# Patient Record
Sex: FEMALE | Race: WHITE | NOT HISPANIC OR LATINO | Employment: FULL TIME | URBAN - METROPOLITAN AREA
[De-identification: names, ages, dates, MRNs, and addresses within clinical notes are randomized per-mention and may not be internally consistent; named-entity substitution may affect disease eponyms.]

---

## 2017-08-24 ENCOUNTER — ALLSCRIPTS OFFICE VISIT (OUTPATIENT)
Dept: OTHER | Facility: OTHER | Age: 52
End: 2017-08-24

## 2017-08-24 ENCOUNTER — TRANSCRIBE ORDERS (OUTPATIENT)
Dept: ADMINISTRATIVE | Facility: HOSPITAL | Age: 52
End: 2017-08-24

## 2017-08-24 DIAGNOSIS — N20.0 URIC ACID NEPHROLITHIASIS: Primary | ICD-10-CM

## 2017-08-24 LAB
BILIRUB UR QL STRIP: NEGATIVE
CLARITY UR: NORMAL
COLOR UR: YELLOW
GLUCOSE (HISTORICAL): NEGATIVE
HGB UR QL STRIP.AUTO: NEGATIVE
KETONES UR STRIP-MCNC: NEGATIVE MG/DL
LEUKOCYTE ESTERASE UR QL STRIP: NORMAL
NITRITE UR QL STRIP: NEGATIVE
PH UR STRIP.AUTO: 6 [PH]
PROT UR STRIP-MCNC: NORMAL MG/DL
SP GR UR STRIP.AUTO: 1.03
UROBILINOGEN UR QL STRIP.AUTO: 0.2

## 2018-01-14 VITALS
DIASTOLIC BLOOD PRESSURE: 58 MMHG | HEIGHT: 66 IN | WEIGHT: 157 LBS | BODY MASS INDEX: 25.23 KG/M2 | SYSTOLIC BLOOD PRESSURE: 94 MMHG

## 2018-02-22 ENCOUNTER — HOSPITAL ENCOUNTER (OUTPATIENT)
Dept: ULTRASOUND IMAGING | Facility: MEDICAL CENTER | Age: 53
Discharge: HOME | End: 2018-02-22
Attending: UROLOGY
Payer: COMMERCIAL

## 2018-02-22 DIAGNOSIS — N20.0 URIC ACID NEPHROLITHIASIS: ICD-10-CM

## 2018-02-22 PROCEDURE — 76770 US EXAM ABDO BACK WALL COMP: CPT

## 2018-02-24 DIAGNOSIS — N20.0 CALCULUS OF KIDNEY: ICD-10-CM

## 2018-03-22 ENCOUNTER — OFFICE VISIT (OUTPATIENT)
Dept: UROLOGY | Facility: MEDICAL CENTER | Age: 53
End: 2018-03-22
Payer: COMMERCIAL

## 2018-03-22 VITALS
DIASTOLIC BLOOD PRESSURE: 62 MMHG | HEIGHT: 67 IN | WEIGHT: 163 LBS | BODY MASS INDEX: 25.58 KG/M2 | SYSTOLIC BLOOD PRESSURE: 102 MMHG

## 2018-03-22 DIAGNOSIS — N20.0 KIDNEY STONES: Primary | ICD-10-CM

## 2018-03-22 LAB
SL AMB  POCT GLUCOSE, UA: NEGATIVE
SL AMB LEUKOCYTE ESTERASE,UA: ABNORMAL
SL AMB POCT BILIRUBIN,UA: NEGATIVE
SL AMB POCT BLOOD,UA: ABNORMAL
SL AMB POCT CLARITY,UA: ABNORMAL
SL AMB POCT COLOR,UA: ABNORMAL
SL AMB POCT KETONES,UA: ABNORMAL
SL AMB POCT NITRITE,UA: NEGATIVE
SL AMB POCT PH,UA: 6
SL AMB POCT SPECIFIC GRAVITY,UA: >=1.03
SL AMB POCT URINE PROTEIN: ABNORMAL
SL AMB POCT UROBILINOGEN: 0.2

## 2018-03-22 PROCEDURE — 99213 OFFICE O/P EST LOW 20 MIN: CPT | Performed by: UROLOGY

## 2018-03-22 PROCEDURE — 81003 URINALYSIS AUTO W/O SCOPE: CPT | Performed by: UROLOGY

## 2018-03-22 RX ORDER — POTASSIUM CITRATE 10 MEQ/1
20 TABLET, EXTENDED RELEASE ORAL
Qty: 90 TABLET | Refills: 11 | Status: SHIPPED | OUTPATIENT
Start: 2018-03-22

## 2018-03-22 RX ORDER — AMPICILLIN TRIHYDRATE 500 MG
2 CAPSULE ORAL DAILY
COMMUNITY

## 2018-03-22 NOTE — LETTER
2018     Rosario Cooper  [address] [facility]  [address]    Patient: Jose Myers   YOB: 1965   Date of Visit: 3/22/2018       Dear Dr Cynthia Easton: Thank you for referring Bryson Holder to me for evaluation  Below are my notes for this consultation  If you have questions, please do not hesitate to call me  I look forward to following your patient along with you  Sincerely,        Karlie Redd MD        CC: No Recipients  Karlie Redd MD  3/22/2018  2:58 PM  Sign at close encounter  [address] [facility]  [address], [address] [place] [phone]  www  [email or web address]      NAME: Jose Myers  AGE: 47 y o  SEX: female  : 1965   MRN: [id number]    DATE: 3/22/2018  TIME: 2:48 PM    Assessment and Plan: Will increase Urocit-K 20 mEq p o  t i d , and we will do a low-dose CT stone study in 3 months  Given that she has a history of uric acid stones, I think that giving her more constant coverage keeping her urine pH above 5 5 will optimize the possibility of her dissolving the stones  The CT scan will also give us a more accurate assessment as to whether this is a single 20 mm stone or a cluster of smaller stones  Chief Complaint   No chief complaint on file  History of Present Illness   Bilateral nephrolithiasis:  History of a 4 mm stone on the right and 8 mm on the left, which are all passable for her  She has smaller stones in both kidneys besides  I increased her dose of Urocit-K last visit  She is here for follow-up with renal ultrasound, which shows a 4 mm stone in the lower pole a 4 mm stone in the upper pole the right kidney with no hydronephrosis, and a 2 cm stone in the left kidney in the lower pole  Obviously the left kidney stone has grown substantially  No hydronephrosis  Urine pH is 6 0    She is on both Urocit-K 20 mEq p  o  b i d  as well as potassium chloride and her potassium still runs borderline low at 3 6  Her whole family has low potassium  She is asymptomatic  The following portions of the patient's history were reviewed and updated as appropriate: allergies, current medications, past family history, past medical history, past social history, past surgical history and problem list     Review of Systems   Review of Systems    Active Problem List   There is no problem list on file for this patient        Objective   /62 (BP Location: Left arm, Patient Position: Sitting)   Ht 5' 7" (1 702 m)   Wt 73 9 kg (163 lb)   BMI 25 53 kg/m²      Physical Exam        Current Medications     Current Outpatient Prescriptions:     Cranberry 450 MG CAPS, Take 2 capsules by mouth daily, Disp: , Rfl:     Lysine 1000 MG TABS, Take 2 tablets by mouth daily, Disp: , Rfl:     Multiple Vitamin (DAILY VALUE MULTIVITAMIN) TABS, Take 1 tablet by mouth daily, Disp: , Rfl:     nortriptyline (PAMELOR) 75 MG capsule, Take 1 capsule by mouth daily, Disp: , Rfl:     potassium chloride (K-DUR,KLOR-CON) 20 mEq tablet, daily  "sometimes more" , Disp: , Rfl:     potassium citrate (UROCIT-K) 10 mEq, Take 2 tablets by mouth 2 (two) times a day, Disp: , Rfl:         Moira De La Paz MD

## 2018-03-22 NOTE — PROGRESS NOTES
[address] [facility]  [facility]  [address] [facility]  [place], [address] [facility]  [phone]  www  [email or web address]  [email or web address]      NAME: Keesha Espino  AGE: 47 y o  SEX: female  : 1965   MRN: [id number]    DATE: 3/22/2018  TIME: 2:48 PM    Assessment and Plan: Will increase Urocit-K 20 mEq p o  t i d , and we will do a low-dose CT stone study in 3 months  Given that she has a history of uric acid stones, I think that giving her more constant coverage keeping her urine pH above 5 5 will optimize the possibility of her dissolving the stones  The CT scan will also give us a more accurate assessment as to whether this is a single 20 mm stone or a cluster of smaller stones  Chief Complaint   No chief complaint on file  History of Present Illness   Bilateral nephrolithiasis:  History of a 4 mm stone on the right and 8 mm on the left, which are all passable for her  She has smaller stones in both kidneys besides  I increased her dose of Urocit-K last visit  She is here for follow-up with renal ultrasound, which shows a 4 mm stone in the lower pole a 4 mm stone in the upper pole the right kidney with no hydronephrosis, and a 2 cm stone in the left kidney in the lower pole  Obviously the left kidney stone has grown substantially  No hydronephrosis  Urine pH is 6 0  She is on both Urocit-K 20 mEq p  o  b i d  as well as potassium chloride and her potassium still runs borderline low at 3 6  Her whole family has low potassium  She is asymptomatic  The following portions of the patient's history were reviewed and updated as appropriate: allergies, current medications, past family history, past medical history, past social history, past surgical history and problem list     Review of Systems   Review of Systems    Active Problem List   There is no problem list on file for this patient        Objective   /62 (BP Location: Left arm, Patient Position: Sitting)   Ht 5' 7" (1 702 m)   Wt 73 9 kg (163 lb)   BMI 25 53 kg/m²     Physical Exam        Current Medications     Current Outpatient Prescriptions:     Cranberry 450 MG CAPS, Take 2 capsules by mouth daily, Disp: , Rfl:     Lysine 1000 MG TABS, Take 2 tablets by mouth daily, Disp: , Rfl:     Multiple Vitamin (DAILY VALUE MULTIVITAMIN) TABS, Take 1 tablet by mouth daily, Disp: , Rfl:     nortriptyline (PAMELOR) 75 MG capsule, Take 1 capsule by mouth daily, Disp: , Rfl:     potassium chloride (K-DUR,KLOR-CON) 20 mEq tablet, daily  "sometimes more" , Disp: , Rfl:     potassium citrate (UROCIT-K) 10 mEq, Take 2 tablets by mouth 2 (two) times a day, Disp: , Rfl:         Geovany Pelaez MD